# Patient Record
Sex: MALE | Race: WHITE
[De-identification: names, ages, dates, MRNs, and addresses within clinical notes are randomized per-mention and may not be internally consistent; named-entity substitution may affect disease eponyms.]

---

## 2019-12-21 ENCOUNTER — HOSPITAL ENCOUNTER (EMERGENCY)
Dept: HOSPITAL 56 - MW.ED | Age: 34
Discharge: HOME | End: 2019-12-21
Payer: OTHER GOVERNMENT

## 2019-12-21 DIAGNOSIS — F17.210: ICD-10-CM

## 2019-12-21 DIAGNOSIS — R10.11: Primary | ICD-10-CM

## 2019-12-21 DIAGNOSIS — Z88.0: ICD-10-CM

## 2019-12-21 LAB
BUN SERPL-MCNC: 14 MG/DL (ref 7–18)
CHLORIDE SERPL-SCNC: 102 MMOL/L (ref 98–107)
CO2 SERPL-SCNC: 25.3 MMOL/L (ref 21–32)
GLUCOSE SERPL-MCNC: 81 MG/DL (ref 74–106)
POTASSIUM SERPL-SCNC: 3.7 MMOL/L (ref 3.5–5.1)
SODIUM SERPL-SCNC: 140 MMOL/L (ref 136–148)

## 2019-12-21 PROCEDURE — 74176 CT ABD & PELVIS W/O CONTRAST: CPT

## 2019-12-21 PROCEDURE — 80053 COMPREHEN METABOLIC PANEL: CPT

## 2019-12-21 PROCEDURE — 99284 EMERGENCY DEPT VISIT MOD MDM: CPT

## 2019-12-21 PROCEDURE — 85025 COMPLETE CBC W/AUTO DIFF WBC: CPT

## 2019-12-21 PROCEDURE — 36415 COLL VENOUS BLD VENIPUNCTURE: CPT

## 2019-12-21 PROCEDURE — 96374 THER/PROPH/DIAG INJ IV PUSH: CPT

## 2019-12-21 PROCEDURE — 81003 URINALYSIS AUTO W/O SCOPE: CPT

## 2019-12-21 NOTE — CT
Indication:



Right flank pain



Technique:



Nonenhanced axial CT imaging through the abdomen and pelvis. Sagittal and 

coronal reconstructions are provided.



Comparison:



None



Findings:



There is decreased attenuation of the liver parenchyma, compatible with 

hepatic steatosis. 



There is unremarkable noncontrast appearance of the gallbladder, spleen, 

pancreas, and adrenal glands.  



There is normal renal parenchymal attenuation without hydronephrosis. There 

are no renal, ureteral, or urinary bladder stones.



The stomach and duodenum are unremarkable. There are no abnormally dilated 

small bowel loops. The appendix is noninflamed. 



There is no colonic wall thickening. No inflammatory changes are 

demonstrated in the mesentery. 



There is no abdominal lymphadenopathy. There is normal caliber of the 

abdominal aorta.



Bilateral pars interarticularis defects are noted at L5. There is trace 

anterolisthesis CT L5-S1. There is mild levoconvex curvature of the lumbar 

spine. 



The included lung bases are clear.



Impression:



1. No nephrolithiasis or urinary obstruction.



2. Hepatic steatosis. 



3. Chronic bilateral L5 pars defects with trace L5-S1 anterolisthesis.



Please note that all CT scans at this facility use dose modulation, 

iterative reconstruction, and/or weight-based dosing when appropriate to 

reduce radiation dose to as low as reasonably achievable.



Dictated by Jabier Latham MD @ Dec 21 2019 10:42PM



Signed by Dr. Jabier Latham @ Dec 21 2019 10:42PM

## 2019-12-21 NOTE — EDM.PDOC
ED [facility] GENERAL MEDICAL PROBLEM





- General


Chief Complaint: Abdominal Pain


Stated Complaint: ABDOMINAL PAIN


Time Seen by Provider: 12/21/19 22:15


Source of Information: Reports: Patient


History Limitations: Reports: No Limitations





- History of Present Illness


INITIAL COMMENTS - FREE TEXT/NARRATIVE: 


Patient is 34-year-old male with no significant past medical history presenting 

with chief complaint of right flank pain.  Patient states the pain started 

yesterday and has been colicky in nature.  Patient denies anything that makes 

it better or worse.  Patient has taken any pain medication prior to arrival.  

Patient feels slightly nauseous when the pain comes on but otherwise has no 

change in appetite.  Patient denies fevers, chills, urinary changes.  Denies 

prior history of kidney stones.  Denies any injuries.





In addition to that documented in the HPI above, the additional ROS was obtained

:


Constitutional: Denies fevers or chills


Eyes: Denies vision changes


ENMT: Denies sore throat


CV: Denies chest pain


Resp: Denies SOB


GI: Denies vomiting or diarrhea


: Denies painful urination


MSK: Denies recent trauma


Skin: Denies new rashes


Neuro: Denies new numbness or tingling or weakness


Endocrine: Denies unexpected weight loss


Heme: Denies bleeding disorders





I have reviewed the triage vital signs


Const: Well nourished, well developed, appears stated age


Eyes: PERRL, no conjunctival injection


HENT: NCAT, Neck supple without meningismus 


CV: RRR, Warm, well-perfused extremities


RESP: CTAB, Unlabored respiratory effort


GI: soft, non-tender, non-distended, no masses


MSK: Right CVA tenderness.  No gross deformities appreciated


Skin: Warm, dry. No rashes


Neuro: Alert, CNs II-XII grossly intact. Sensation and motor function of 

extremities grossly intact.


Psych: Appropriate mood and affect








  ** Right Upper Abdomen


Pain Score (Numeric/FACES): 3





- Related Data


 Allergies











Allergy/AdvReac Type Severity Reaction Status Date / Time


 


Penicillins Allergy  Anaphylactic Verified 12/21/19 21:37





   Shock  











Home Meds: 


 Home Meds





. [No Known Home Meds]  12/21/19 [History]











Past Medical History





- Past Health History


Medical/Surgical History: Denies Medical/Surgical History


Psychiatric History: Reports: Anxiety, Depression, Mood Swings, PTSD





- Infectious Disease History


Infectious Disease History: Reports: Chicken Pox





- Past Surgical History


GI Surgical History: Reports: Hernia Repair/Other





Social & Family History





- Tobacco Use


Smoking Status *Q: Current Every Day Smoker


Years of Tobacco use: 20


Packs/Tins Daily: 1





- Recreational Drug Use


Recreational Drug Use: No





ED ROS GENERAL





- Review of Systems


Review Of Systems: See Below





ED EXAM, UPPER BACK/NECK PAIN





- Physical Exam


Exam: See Below





Course





- Vital Signs


Last Recorded V/S: 





 Last Vital Signs











Temp  36.1 C   12/21/19 21:33


 


Pulse  73   12/21/19 22:42


 


Resp  16   12/21/19 22:42


 


BP  115/82   12/21/19 21:33


 


Pulse Ox  99   12/21/19 22:42














- Orders/Labs/Meds


Labs: 





 Laboratory Tests











  12/21/19 12/21/19 12/21/19 Range/Units





  21:43 21:43 21:43 


 


WBC  9.02    (4.0-11.0)  K/uL


 


RBC  5.52    (4.50-5.90)  M/uL


 


Hgb  16.5    (13.0-17.0)  g/dL


 


Hct  46.5    (38.0-50.0)  %


 


MCV  84.2    (80.0-98.0)  fL


 


MCH  29.9    (27.0-32.0)  pg


 


MCHC  35.5    (31.0-37.0)  g/dL


 


RDW Std Deviation  43.4    (28.0-62.0)  fl


 


RDW Coeff of Eliezer  14    (11.0-15.0)  %


 


Plt Count  163    (150-400)  K/uL


 


MPV  10.40    (7.40-12.00)  fL


 


Neut % (Auto)  64.0    (48.0-80.0)  %


 


Lymph % (Auto)  27.4    (16.0-40.0)  %


 


Mono % (Auto)  6.9    (0.0-15.0)  %


 


Eos % (Auto)  1.4    (0.0-7.0)  %


 


Baso % (Auto)  0.3    (0.0-1.5)  %


 


Neut # (Auto)  5.8 H    (1.4-5.7)  K/uL


 


Lymph # (Auto)  2.5 H    (0.6-2.4)  K/uL


 


Mono # (Auto)  0.6    (0.0-0.8)  K/uL


 


Eos # (Auto)  0.1    (0.0-0.7)  K/uL


 


Baso # (Auto)  0.0    (0.0-0.1)  K/uL


 


Nucleated RBC %  0.0    /100WBC


 


Nucleated RBCs #  0    K/uL


 


Sodium   140   (136-148)  mmol/L


 


Potassium   3.7   (3.5-5.1)  mmol/L


 


Chloride   102   ()  mmol/L


 


Carbon Dioxide   25.3   (21.0-32.0)  mmol/L


 


BUN   14   (7.0-18.0)  mg/dL


 


Creatinine   1.1   (0.8-1.3)  mg/dL


 


Est Cr Clr Drug Dosing   117.27   mL/min


 


Estimated GFR (MDRD)   > 60.0   ml/min


 


Glucose   81   ()  mg/dL


 


Calcium   8.8   (8.5-10.1)  mg/dL


 


Total Bilirubin   0.6   (0.2-1.0)  mg/dL


 


AST   42 H   (15-37)  IU/L


 


ALT   93 H   (14-63)  IU/L


 


Alkaline Phosphatase   73   ()  U/L


 


Total Protein   7.5   (6.4-8.2)  g/dL


 


Albumin   4.3   (3.4-5.0)  g/dL


 


Globulin   3.2   (2.6-4.0)  g/dL


 


Albumin/Globulin Ratio   1.3   (0.9-1.6)  


 


Urine Color    YELLOW  


 


Urine Appearance    CLEAR  


 


Urine pH    5.5  (5.0-8.0)  


 


Ur Specific Gravity    1.010  (1.001-1.035)  


 


Urine Protein    NEGATIVE  (NEGATIVE)  mg/dL


 


Urine Glucose (UA)    NEGATIVE  (NEGATIVE)  mg/dL


 


Urine Ketones    NEGATIVE  (NEGATIVE)  mg/dL


 


Urine Occult Blood    NEGATIVE  (NEGATIVE)  


 


Urine Nitrite    NEGATIVE  (NEGATIVE)  


 


Urine Bilirubin    NEGATIVE  (NEGATIVE)  


 


Urine Urobilinogen    0.2  (<2.0)  EU/dL


 


Ur Leukocyte Esterase    NEGATIVE  (NEGATIVE)  











Meds: 





Medications














Discontinued Medications














Generic Name Dose Route Start Last Admin





  Trade Name Freq  PRN Reason Stop Dose Admin


 


Ketorolac Tromethamine  15 mg  12/21/19 21:58  12/21/19 22:12





  Toradol  IVPUSH  12/21/19 21:59  15 mg





  ONETIME ONE   Administration





     





     





     





     














Departure





- Departure


Time of Disposition: 23:05


Disposition: Home, Self-Care 01


Clinical Impression: 


 Flank pain








- Discharge Information


Instructions:  Flank Pain, Adult


Referrals: 


PCP,Not In Area [Primary Care Provider] - 


Forms:  ED Department Discharge


Additional Instructions: 


The following information is given to patients seen in the emergency department 

who are being discharged to home. This information is to outline your options 

for follow-up care. We provide all patients seen in our emergency department 

with a follow-up referral.





The need for follow-up, as well as the timing and circumstances, are variable 

depending upon the specifics of your emergency department visit.





If you don't have a primary care physician on staff, we will provide you with a 

referral. We always advise you to contact your personal physician following an 

emergency department visit to inform them of the circumstance of the visit and 

for follow-up with them and/or the need for any referrals to a consulting 

specialist.





The emergency department will also refer you to a specialist when appropriate. 

This referral assures that you have the opportunity for follow-up care with a 

specialist. All of these measure are taken in an effort to provide you with 

optimal care, which includes your follow-up.





Under all circumstances we always encourage you to contact your private 

physician who remains a resource for coordinating your care. When calling for 

follow-up care, please make the office aware that this follow-up is from your 

recent emergency room visit. If for any reason you are refused follow-up, 

please contact the [facility] Emergency 

Department at [phone] and asked to speak to the emergency department 

charge nurse.








Sepsis Event Note





- Evaluation


Sepsis Screening Result: No Definite Risk





- Focused Exam


Vital Signs: 





 Vital Signs











  Temp Pulse Resp BP Pulse Ox


 


 12/21/19 22:42   73  16   99


 


 12/21/19 21:33  36.1 C  97  18  115/82  98











Date Exam was Performed: 12/21/19


Time Exam was Performed: 23:03





- Assessment/Plan


Assessment:: 


Patient is 34 male with complaint of right flank pain.  Patient vital signs are 

stable within the emergency department.  Patient's pain resolved after 

administration of Toradol.  Patient CT scan negative for evidence of infection 

or kidney stone.  Patient's labs are within normal limits.  Patient's 

urinalysis is negative.  This point pain is likely more musculoskeletal in 

nature and patient will be treated as outpatient with over-the-counter pain 

medications.  Patient has no evidence of lower lobe pneumonia.  Patient given 

strict return precautions and all questions addressed and answered.  Patient 

agrees with plan